# Patient Record
Sex: MALE | Race: OTHER | Employment: FULL TIME | URBAN - METROPOLITAN AREA
[De-identification: names, ages, dates, MRNs, and addresses within clinical notes are randomized per-mention and may not be internally consistent; named-entity substitution may affect disease eponyms.]

---

## 2017-10-16 ENCOUNTER — OFFICE VISIT (OUTPATIENT)
Dept: FAMILY MEDICINE CLINIC | Facility: CLINIC | Age: 22
End: 2017-10-16

## 2017-10-16 VITALS
BODY MASS INDEX: 47 KG/M2 | SYSTOLIC BLOOD PRESSURE: 139 MMHG | TEMPERATURE: 99 F | DIASTOLIC BLOOD PRESSURE: 82 MMHG | WEIGHT: 298 LBS | HEART RATE: 96 BPM

## 2017-10-16 DIAGNOSIS — J18.9 COMMUNITY ACQUIRED PNEUMONIA, UNSPECIFIED LATERALITY: Primary | ICD-10-CM

## 2017-10-16 PROCEDURE — 99213 OFFICE O/P EST LOW 20 MIN: CPT | Performed by: FAMILY MEDICINE

## 2017-10-16 PROCEDURE — 99212 OFFICE O/P EST SF 10 MIN: CPT | Performed by: FAMILY MEDICINE

## 2017-10-16 RX ORDER — AZITHROMYCIN 500 MG/1
500 TABLET, FILM COATED ORAL DAILY
COMMUNITY
End: 2017-11-14 | Stop reason: ALTCHOICE

## 2017-10-16 NOTE — PROGRESS NOTES
10/16/2017 11:43 AM    Jeni Mcdonald, : 1995  Patient presents with:  ER F/U: Patient was seen in [facility] urgent care for pnuemonia. Sx still include fever, coughing, sputumn production and SOB, and chest discomfort.        HPI:     Jeni Mcdonald is a Medical History:   Diagnosis Date   • Seasonal allergies        Past Surgical History: Past Surgical History:  No date: OTHER SURGICAL HISTORY      Comment: TMs tube placement.     Social History:     Social History  Social History   Marital status: Single atraumatic  EYES: sclera non icteric bilateral, conjunctiva clear  EARS: TM  bilateral: normal  NOSE: nasal turbinates: pink, normal mucosa  THROAT: clear, without exudates  LUNGS: clear to auscultation bilaterally; no rales, rhonchi, or wheezes  ABDOMINAL

## 2017-11-14 ENCOUNTER — OFFICE VISIT (OUTPATIENT)
Dept: FAMILY MEDICINE CLINIC | Facility: CLINIC | Age: 22
End: 2017-11-14

## 2017-11-14 VITALS
HEART RATE: 76 BPM | WEIGHT: 294 LBS | SYSTOLIC BLOOD PRESSURE: 142 MMHG | DIASTOLIC BLOOD PRESSURE: 93 MMHG | BODY MASS INDEX: 47 KG/M2 | TEMPERATURE: 98 F

## 2017-11-14 DIAGNOSIS — R05.8 DRY COUGH: ICD-10-CM

## 2017-11-14 DIAGNOSIS — R22.31 MASS OF RIGHT WRIST: Primary | ICD-10-CM

## 2017-11-14 PROCEDURE — 99214 OFFICE O/P EST MOD 30 MIN: CPT | Performed by: FAMILY MEDICINE

## 2017-11-14 PROCEDURE — 99212 OFFICE O/P EST SF 10 MIN: CPT | Performed by: FAMILY MEDICINE

## 2017-11-14 NOTE — PROGRESS NOTES
11/14/2017  10:20 AM    Ilda Homans is a 25year old male. Chief complaint(s): Patient presents with:  Wrist Pain: left wrist pain and small mass noticed about 3 weeks ago. HPI:     Ilda Homans primary complaint is regarding wrist mass.      Patient is (Active prior to today's visit):    Current Outpatient Prescriptions:  Albuterol Sulfate (PROVENTIL HFA IN) Inhale into the lungs.  Disp:  Rfl:        Allergies:    Dust Mites              Itching  Pollen                  Itching      ROS:   Review of Syste RECOMMENDATIONS given include: Please, call our office with any questions or concerns. Notify Dr Ganga Norton or the [facility] if there is a deterioration or worsening of the medical condition.  Also, inform the doctor with any new symptoms or medica

## 2019-12-12 ENCOUNTER — OFFICE VISIT (OUTPATIENT)
Dept: FAMILY MEDICINE CLINIC | Facility: CLINIC | Age: 24
End: 2019-12-12
Payer: COMMERCIAL

## 2019-12-12 ENCOUNTER — LAB ENCOUNTER (OUTPATIENT)
Dept: LAB | Age: 24
End: 2019-12-12
Attending: FAMILY MEDICINE
Payer: COMMERCIAL

## 2019-12-12 ENCOUNTER — HOSPITAL ENCOUNTER (OUTPATIENT)
Dept: GENERAL RADIOLOGY | Age: 24
Discharge: HOME | End: 2019-12-12
Attending: FAMILY MEDICINE
Payer: COMMERCIAL

## 2019-12-12 ENCOUNTER — APPOINTMENT (OUTPATIENT)
Dept: LAB | Age: 24
End: 2019-12-12
Attending: FAMILY MEDICINE
Payer: COMMERCIAL

## 2019-12-12 VITALS
WEIGHT: 240 LBS | HEIGHT: 67 IN | TEMPERATURE: 98 F | HEART RATE: 58 BPM | SYSTOLIC BLOOD PRESSURE: 127 MMHG | BODY MASS INDEX: 37.67 KG/M2 | DIASTOLIC BLOOD PRESSURE: 68 MMHG

## 2019-12-12 DIAGNOSIS — Z11.3 SCREEN FOR STD (SEXUALLY TRANSMITTED DISEASE): ICD-10-CM

## 2019-12-12 DIAGNOSIS — M25.561 ACUTE PAIN OF RIGHT KNEE: ICD-10-CM

## 2019-12-12 DIAGNOSIS — Z00.00 PHYSICAL EXAM: ICD-10-CM

## 2019-12-12 DIAGNOSIS — Z00.00 PHYSICAL EXAM: Primary | ICD-10-CM

## 2019-12-12 PROCEDURE — 73564 X-RAY EXAM KNEE 4 OR MORE: CPT | Performed by: FAMILY MEDICINE

## 2019-12-12 PROCEDURE — 83036 HEMOGLOBIN GLYCOSYLATED A1C: CPT

## 2019-12-12 PROCEDURE — 86706 HEP B SURFACE ANTIBODY: CPT

## 2019-12-12 PROCEDURE — 86780 TREPONEMA PALLIDUM: CPT

## 2019-12-12 PROCEDURE — 93010 ELECTROCARDIOGRAM REPORT: CPT | Performed by: FAMILY MEDICINE

## 2019-12-12 PROCEDURE — 80053 COMPREHEN METABOLIC PANEL: CPT

## 2019-12-12 PROCEDURE — 86694 HERPES SIMPLEX NES ANTBDY: CPT

## 2019-12-12 PROCEDURE — 86709 HEPATITIS A IGM ANTIBODY: CPT

## 2019-12-12 PROCEDURE — 86708 HEPATITIS A ANTIBODY: CPT

## 2019-12-12 PROCEDURE — 86695 HERPES SIMPLEX TYPE 1 TEST: CPT

## 2019-12-12 PROCEDURE — 87340 HEPATITIS B SURFACE AG IA: CPT

## 2019-12-12 PROCEDURE — 86704 HEP B CORE ANTIBODY TOTAL: CPT

## 2019-12-12 PROCEDURE — 87491 CHLMYD TRACH DNA AMP PROBE: CPT | Performed by: FAMILY MEDICINE

## 2019-12-12 PROCEDURE — 93005 ELECTROCARDIOGRAM TRACING: CPT

## 2019-12-12 PROCEDURE — 87591 N.GONORRHOEAE DNA AMP PROB: CPT | Performed by: FAMILY MEDICINE

## 2019-12-12 PROCEDURE — 81015 MICROSCOPIC EXAM OF URINE: CPT | Performed by: FAMILY MEDICINE

## 2019-12-12 PROCEDURE — 86803 HEPATITIS C AB TEST: CPT

## 2019-12-12 PROCEDURE — 84443 ASSAY THYROID STIM HORMONE: CPT

## 2019-12-12 PROCEDURE — 81001 URINALYSIS AUTO W/SCOPE: CPT | Performed by: FAMILY MEDICINE

## 2019-12-12 PROCEDURE — 87389 HIV-1 AG W/HIV-1&-2 AB AG IA: CPT

## 2019-12-12 PROCEDURE — 99395 PREV VISIT EST AGE 18-39: CPT | Performed by: FAMILY MEDICINE

## 2019-12-12 PROCEDURE — 36415 COLL VENOUS BLD VENIPUNCTURE: CPT

## 2019-12-12 PROCEDURE — 86696 HERPES SIMPLEX TYPE 2 TEST: CPT

## 2019-12-12 PROCEDURE — 99213 OFFICE O/P EST LOW 20 MIN: CPT | Performed by: FAMILY MEDICINE

## 2019-12-12 PROCEDURE — 80061 LIPID PANEL: CPT

## 2019-12-12 PROCEDURE — 80500 HEPATITIS A B + C PROFILE: CPT

## 2019-12-12 PROCEDURE — 85025 COMPLETE CBC W/AUTO DIFF WBC: CPT

## 2019-12-12 NOTE — PROGRESS NOTES
12/12/2019  2:04 PM    Joseph Reddy is a 25year old male. Chief complaint(s): Patient presents with:  Routine Physical    HPI:     Joseph Reddy primary complaint is regarding CPE.      Joseph Reddy is a 25year old male is here for routine periodic health scr 03/22/1996                            10/04/1996  08/27/1999      HEP B                 08/26/1995 12/22/1995 03/22/1996      HIB                   10/25/1995  12/22/1995  03/22/1996                            10/04/1996  08/27/1999      IPV without icteric. Normal tympanic membranes, normal light reflex bilaterally. Normal finger rubbing hearing exam, bilaterally. Clear nostril normal nasal mucosa. Throat clear without exudate, lesions or enlarge tonsils. Neck: Neck supple.  No JVD presen Reflex CULTURE      A  Hepatitis A B + C profile [E]      A RPR Treponemal Pallidum Screening [place] [id number]      A  HSV Types 1 + 2 Ab, IgG and IgM with Reflex to Type 1 and 2 Glycoprotein G-Specific Ab, IgG [E]      HIV AG AB Combo [E]      A  Chlamydia weeks.        3. Screen for STD (sexually transmitted disease)          LABORATORY & ORDERS: Orders Placed This Encounter      CBC W Differential W Platelet [E]      Comp Metabolic Panel (14) [E]      Hemoglobin A1C [E]      Lipid Panel [E]      TSH W Refl

## 2019-12-16 ENCOUNTER — TELEPHONE (OUTPATIENT)
Dept: FAMILY MEDICINE CLINIC | Facility: CLINIC | Age: 24
End: 2019-12-16

## 2019-12-16 NOTE — TELEPHONE ENCOUNTER
Spoke with the patient who is requesting to know what was abnormal about his HSV Type 1 and 2 results. Patient was informed that he was positive for the HSV Type 1 and 2 antibodies. Patient reports his girlfriend tested positive for Chlamydia. Per chart review patient was negative for chlamydia. Patient was advised to use condoms during intercourse. Patient wanted a sooner appointment to see Dr. Menard Monday. Appointment was scheduled for tomorrow 12/17/19. Patient voiced understanding.

## 2019-12-17 ENCOUNTER — OFFICE VISIT (OUTPATIENT)
Dept: FAMILY MEDICINE CLINIC | Facility: CLINIC | Age: 24
End: 2019-12-17
Payer: COMMERCIAL

## 2019-12-17 VITALS
WEIGHT: 240 LBS | HEIGHT: 67 IN | HEART RATE: 62 BPM | TEMPERATURE: 99 F | DIASTOLIC BLOOD PRESSURE: 76 MMHG | BODY MASS INDEX: 37.67 KG/M2 | SYSTOLIC BLOOD PRESSURE: 130 MMHG

## 2019-12-17 DIAGNOSIS — Z20.2 CHLAMYDIA CONTACT: ICD-10-CM

## 2019-12-17 DIAGNOSIS — B00.9 HSV INFECTION: Primary | ICD-10-CM

## 2019-12-17 PROCEDURE — 99213 OFFICE O/P EST LOW 20 MIN: CPT | Performed by: FAMILY MEDICINE

## 2019-12-17 RX ORDER — AZITHROMYCIN 250 MG/1
TABLET, FILM COATED ORAL
Qty: 4 TABLET | Refills: 0 | Status: SHIPPED | OUTPATIENT
Start: 2019-12-17 | End: 2020-01-08 | Stop reason: ALTCHOICE

## 2019-12-17 RX ORDER — AZITHROMYCIN 250 MG/1
TABLET, FILM COATED ORAL
Qty: 4 TABLET | Refills: 0 | Status: SHIPPED | OUTPATIENT
Start: 2019-12-17 | End: 2019-12-17

## 2019-12-17 NOTE — PROGRESS NOTES
12/17/2019  3:35 PM    Tatyana Birmingham is a 25year old male. Chief complaint(s): Patient presents with:  Abnormal Labs: +STD results on 12/12/19 f/u    HPI:     Tatyana Birmingham primary complaint is regarding STDs  .      Patient is a 59-year-old male who recently Medications   Medication Sig Dispense Refill   • azithromycin (ZITHROMAX Z-HUNTER) 250 MG Oral Tab Take 4 tablets by mouth today 4 tablet 0       Allergies:    Dust Mites              ITCHING  Pollen                  ITCHING      ROS:   Review of Systems   Co 5. 3 <5.7 %    Estimated Average Glucose 105 68 - 126 mg/dL   LIPID PANEL   Result Value Ref Range    Cholesterol, Total 125 <200 mg/dL    HDL Cholesterol 30 (L) 40 - 59 mg/dL    Triglycerides 113 30 - 149 mg/dL    LDL Cholesterol 72 <100 mg/dL    VLDL 23 0 <=0.90 IV   HSV TYPE 2-SPECIFIC AB, IGG RFLX   Result Value Ref Range    Hsv 2 Glycoprotein G Ab, IgG 16.40 (H) <=0.90 IV   CBC W/ DIFFERENTIAL   Result Value Ref Range    WBC 10.7 4.0 - 11.0 x10(3) uL    RBC 4.93 4.30 - 5.70 x10(6)uL    HGB 14.5 13.0 - 17 tablet 0     Sig: Take 4 tablets by mouth today        RECOMMENDATIONS given include: Please, call our office with any questions or concerns. Notify Dr Ronaldo Sanders or the [facility] if there is a deterioration or worsening of the medical condition.  Also,

## 2020-01-08 ENCOUNTER — TELEPHONE (OUTPATIENT)
Dept: FAMILY MEDICINE CLINIC | Facility: CLINIC | Age: 25
End: 2020-01-08

## 2020-01-08 ENCOUNTER — OFFICE VISIT (OUTPATIENT)
Dept: FAMILY MEDICINE CLINIC | Facility: CLINIC | Age: 25
End: 2020-01-08
Payer: COMMERCIAL

## 2020-01-08 ENCOUNTER — NURSE TRIAGE (OUTPATIENT)
Dept: OTHER | Age: 25
End: 2020-01-08

## 2020-01-08 VITALS
BODY MASS INDEX: 40.65 KG/M2 | HEIGHT: 67 IN | SYSTOLIC BLOOD PRESSURE: 121 MMHG | WEIGHT: 259 LBS | TEMPERATURE: 98 F | DIASTOLIC BLOOD PRESSURE: 79 MMHG | HEART RATE: 63 BPM

## 2020-01-08 DIAGNOSIS — K52.9 GASTROENTERITIS: Primary | ICD-10-CM

## 2020-01-08 DIAGNOSIS — F32.9 REACTIVE DEPRESSION: ICD-10-CM

## 2020-01-08 PROCEDURE — 99213 OFFICE O/P EST LOW 20 MIN: CPT | Performed by: FAMILY MEDICINE

## 2020-01-08 NOTE — TELEPHONE ENCOUNTER
Patient called in with symptoms below:    -Stomach pain  -Vomiting   -Diarrhea    Patient states that it's been going on for a few days. CSS transferred to triage/decision tree denies appointment.

## 2020-01-08 NOTE — TELEPHONE ENCOUNTER
Action Requested: Summary for Provider     []  Critical Lab, Recommendations Needed  [] Need Additional Advice  []   FYI    []   Need Orders  [] Need Medications Sent to Pharmacy  []  Other     SUMMARY: OV scheduled today with Dr Candice Lala    Per pt x 3 day

## 2020-01-08 NOTE — PROGRESS NOTES
2020 10:24 AM    Cherie Lucas, : 1995  Patient presents with:  Note For Work: x vomiting and nauseas started 19 ended 19. Pt doesn't have any sxs at this time.     HPI:     Cherie Lucas is a 25year old male who presents for evaluation o placement.        Social History: Social History    Socioeconomic History      Marital status: Single      Spouse name: Not on file      Number of children: Not on file      Years of education: Not on file      Highest education level: Not on file    [place] 10/04/1996  08/27/1999      IPV                   10/25/1995  12/22/1995  03/22/1996                            03/27/1999      MMR                   10/04/1996  08/27/1999      Tb Intradermal Test   05/20/2000      Allergies:   Dust Mites FOLLOW-UP:  Instructed to call if new or worsening symptoms develop. Schedule a follow-up appointment  prn. Orders Placed This Encounter      Matheus Celis. Duy Gonzalez MD Psychiatrist          Order Comments:              Matheus Celis.  Duy Gonzalez MD

## 2020-03-12 ENCOUNTER — TELEPHONE (OUTPATIENT)
Dept: FAMILY MEDICINE CLINIC | Facility: CLINIC | Age: 25
End: 2020-03-12

## 2020-03-12 ENCOUNTER — OFFICE VISIT (OUTPATIENT)
Dept: FAMILY MEDICINE CLINIC | Facility: CLINIC | Age: 25
End: 2020-03-12
Payer: COMMERCIAL

## 2020-03-12 DIAGNOSIS — Z11.1 SCREENING FOR TUBERCULOSIS: Primary | ICD-10-CM

## 2020-03-12 NOTE — PATIENT INSTRUCTIONS
You will need to return to clinic in 48-72 hours to have results of TB test read. Please return to clinic on 3/14/20 after 12:15 pm or on 3/15/20 before 12:15 pm to have your TB test read.     If you do not return during this time your test will need to

## 2020-03-12 NOTE — TELEPHONE ENCOUNTER
Patient was here in clinic today. MD filled out and signed a statement of satisfactory health form and it was sent to be scanned into the chart.

## 2020-03-15 ENCOUNTER — OFFICE VISIT (OUTPATIENT)
Dept: FAMILY MEDICINE CLINIC | Facility: CLINIC | Age: 25
End: 2020-03-15
Payer: COMMERCIAL

## 2020-03-15 DIAGNOSIS — Z11.1 ENCOUNTER FOR PPD SKIN TEST READING: Primary | ICD-10-CM

## 2020-03-15 LAB — INDURATION (): 0 MM (ref 0–11)

## 2020-04-06 ENCOUNTER — NURSE TRIAGE (OUTPATIENT)
Dept: FAMILY MEDICINE CLINIC | Facility: CLINIC | Age: 25
End: 2020-04-06

## 2020-04-06 ENCOUNTER — TELEPHONE (OUTPATIENT)
Dept: FAMILY MEDICINE CLINIC | Facility: CLINIC | Age: 25
End: 2020-04-06

## 2020-04-06 DIAGNOSIS — A09 DIARRHEA OF INFECTIOUS ORIGIN: Primary | ICD-10-CM

## 2020-04-06 PROCEDURE — 99212 OFFICE O/P EST SF 10 MIN: CPT | Performed by: FAMILY MEDICINE

## 2020-04-06 RX ORDER — SULFAMETHOXAZOLE AND TRIMETHOPRIM 800; 160 MG/1; MG/1
1 TABLET ORAL 2 TIMES DAILY
Qty: 14 TABLET | Refills: 0 | Status: SHIPPED | OUTPATIENT
Start: 2020-04-06 | End: 2020-04-16

## 2020-04-06 NOTE — TELEPHONE ENCOUNTER
Action Requested: Summary for Provider     []  Critical Lab, Recommendations Needed  [x] Need Additional Advice  []   FYI    []   Need Orders  [] Need Medications Sent to Pharmacy  []  Other     SUMMARY: Patient reports having diarrhea for 3 days.        Sp

## 2020-04-06 NOTE — TELEPHONE ENCOUNTER
Virtual/Telephone Check-In    Hashimagil Bell verbally consents to a Virtual/Telephone Check-In service on 04/06/20. Patient understands and accepts financial responsibility for any deductible, co-insurance and/or co-pays associated with this service.     Leonel

## 2020-05-19 ENCOUNTER — HOSPITAL (OUTPATIENT)
Dept: OTHER | Age: 25
End: 2020-05-19
Attending: NURSE PRACTITIONER

## 2020-05-20 ENCOUNTER — TELEPHONE (OUTPATIENT)
Dept: SCHEDULING | Age: 25
End: 2020-05-20

## 2020-07-17 ENCOUNTER — VIRTUAL PHONE E/M (OUTPATIENT)
Dept: FAMILY MEDICINE CLINIC | Facility: CLINIC | Age: 25
End: 2020-07-17
Payer: COMMERCIAL

## 2020-07-17 ENCOUNTER — NURSE TRIAGE (OUTPATIENT)
Dept: FAMILY MEDICINE CLINIC | Facility: CLINIC | Age: 25
End: 2020-07-17

## 2020-07-17 ENCOUNTER — TELEPHONE (OUTPATIENT)
Dept: FAMILY MEDICINE CLINIC | Facility: CLINIC | Age: 25
End: 2020-07-17

## 2020-07-17 DIAGNOSIS — R19.7 DIARRHEA, UNSPECIFIED TYPE: Primary | ICD-10-CM

## 2020-07-17 PROCEDURE — 99213 OFFICE O/P EST LOW 20 MIN: CPT | Performed by: PHYSICIAN ASSISTANT

## 2020-07-17 NOTE — TELEPHONE ENCOUNTER
Work letter was fax to [employer] @ [phone]. confirmation recd. No further action needed.

## 2020-07-17 NOTE — TELEPHONE ENCOUNTER
Pt stated that he has been having diarrhea and vomiting. Pt had 8 loose stools of diarrhea yesterday and he vomit twice. Pt is able to hold fluids down. Pt stated that he feels some discomfort on top of his abd area.  He feels he might have ate something ba

## 2020-07-17 NOTE — PROGRESS NOTES
Please note that the following visit was completed using two-way, real-time interactive audio and/or video communication.   This has been done in good nelli to provide continuity of care in the best interest of the provider-patient relationship, due to the Vitals signs taken at home if available:    Limited examination for this telephone visit due to the coronavirus emergency    Patient was speaking in complete sentences, no increased work of breathing and very coherent and alert on the phone.   Alert and

## 2021-01-04 ENCOUNTER — VIRTUAL PHONE E/M (OUTPATIENT)
Dept: FAMILY MEDICINE CLINIC | Facility: CLINIC | Age: 26
End: 2021-01-04
Payer: COMMERCIAL

## 2021-01-04 DIAGNOSIS — U07.1 COVID-19: Primary | ICD-10-CM

## 2021-01-04 PROCEDURE — 99213 OFFICE O/P EST LOW 20 MIN: CPT | Performed by: PHYSICIAN ASSISTANT

## 2021-01-05 ENCOUNTER — LAB ENCOUNTER (OUTPATIENT)
Dept: LAB | Age: 26
End: 2021-01-05
Attending: PHYSICIAN ASSISTANT
Payer: COMMERCIAL

## 2021-01-05 DIAGNOSIS — U07.1 COVID-19: ICD-10-CM

## 2021-01-07 LAB — SARS-COV-2 RNA RESP QL NAA+PROBE: NOT DETECTED

## (undated) NOTE — LETTER
1/8/2020          To Whom It May Concern:    Jojo Gross is currently under my medical care and may return to work at this time. Please excuse Milagro Balderas for 12/28-29/2019 . He may return to work . Activity is restricted as follows: none.     If you require a

## (undated) NOTE — LETTER
7/17/2020          To Whom It May Concern:    Erica Bell is currently under my medical care and may not return to work at this time. Please excuse [place] for 3 days. He may return to work on Monday July 20th, 2020.   Activity is restricted as follows: non

## (undated) NOTE — LETTER
10/16/2017          To Whom It May Concern:    Deyanira Finn is currently under my medical care and may not return to work at this time. Please excuse Kamlesh Dc for 4 days. He may return to work on 10/18/17. Activity is restricted as follows: none.     If you

## (undated) NOTE — LETTER
4/6/2020          To Whom It May Concern:    Bhargav Shaffer is currently under my medical care and may return to work at this time. He may return to work on 04/07/20. Activity is restricted as follows: none.     If you require additional information please

## (undated) NOTE — LETTER
1/4/2021          To Whom It May Concern:    Kim Dennis is currently under my medical care and may not return to work at this time. Please excuse Isaías Cassette for 4 days. He may return to work once we have his covid tests back.  We are testing for covid, and on